# Patient Record
Sex: FEMALE | Race: WHITE
[De-identification: names, ages, dates, MRNs, and addresses within clinical notes are randomized per-mention and may not be internally consistent; named-entity substitution may affect disease eponyms.]

---

## 2020-10-03 ENCOUNTER — HOSPITAL ENCOUNTER (EMERGENCY)
Dept: HOSPITAL 61 - ER | Age: 20
Discharge: HOME | End: 2020-10-03
Payer: COMMERCIAL

## 2020-10-03 VITALS
DIASTOLIC BLOOD PRESSURE: 62 MMHG | SYSTOLIC BLOOD PRESSURE: 126 MMHG | SYSTOLIC BLOOD PRESSURE: 126 MMHG | DIASTOLIC BLOOD PRESSURE: 62 MMHG

## 2020-10-03 VITALS — WEIGHT: 250.45 LBS | BODY MASS INDEX: 41.73 KG/M2 | HEIGHT: 65 IN

## 2020-10-03 DIAGNOSIS — Z90.89: ICD-10-CM

## 2020-10-03 DIAGNOSIS — R60.0: ICD-10-CM

## 2020-10-03 DIAGNOSIS — Z98.890: ICD-10-CM

## 2020-10-03 DIAGNOSIS — F17.200: ICD-10-CM

## 2020-10-03 DIAGNOSIS — M25.532: Primary | ICD-10-CM

## 2020-10-03 DIAGNOSIS — M79.632: ICD-10-CM

## 2020-10-03 PROCEDURE — 29125 APPL SHORT ARM SPLINT STATIC: CPT

## 2020-10-03 PROCEDURE — 99284 EMERGENCY DEPT VISIT MOD MDM: CPT

## 2020-10-03 PROCEDURE — 73090 X-RAY EXAM OF FOREARM: CPT

## 2020-10-03 PROCEDURE — 73110 X-RAY EXAM OF WRIST: CPT

## 2020-10-03 PROCEDURE — A4565 SLINGS: HCPCS

## 2020-10-03 NOTE — PHYS DOC
Past Medical History


Past Medical History:  No Pertinent History


 (COLT JAIME APRN)


Past Surgical History:  Appendectomy


Additional Past Surgical Histo:  TOOTH SX


 (COLT JAIME APRN)


Smoking Status:  Current Every Day Smoker


Alcohol Use:  None


 (COLT JAIME)





General Adult


EDM:


Chief Complaint:  WRIST PAIN





HPI:


HPI:





Patient is a 19  year old female who presents with states this evening she was 

getting up out of her bed when she lost her footing and went to grab to catch 

herself as she was falling and she ended up falling on it please [employer] with 

her left forearm band under her.  She is having left wrist and lateral forearm 

pain.  There is 2+ swelling.  She does have range of motion of her wrist but is 

painful.  She can make a fist but again it is painful and she cannot make a 

tight fist.  She rates her aching pain a 7 out of 10.  She states she did not 

take any pain medicine prior to coming.  Past medical history is smoking in a 

tooth extraction.


 (COLT JAIME APRN)





Review of Systems:


Review of Systems:


Constitutional:   Denies fever or chills. []


Eyes:   Denies change in visual acuity. []


HENT:   Denies nasal congestion or sore throat. [] 


Respiratory:   Denies cough or shortness of breath. [] 


Cardiovascular:   Denies chest pain.  Left wrist and forearm 2+ edema. [] 


GI:   Denies abdominal pain, nausea, vomiting, bloody stools or diarrhea. [] 


:  Denies dysuria. [] 


Musculoskeletal:   Denies back pain.  Left wrist and forearm joint pain. [] 


Integument:   Denies rash. [] 


Neurologic:   Denies headache, focal weakness or sensory changes. [] 


Endocrine:   Denies polyuria or polydipsia. [] 


Lymphatic:  Denies swollen glands. [] 


Psychiatric:  Denies depression or anxiety. []


 (COLT JAIME APRN)





Heart Score:


Risk Factors:


Risk Factors:  DM, Current or recent (<one month) smoker, HTN, HLP, family hi

story of CAD, obesity.


Risk Scores:


Score 0 - 3:  2.5% MACE over next 6 weeks - Discharge Home


Score 4 - 6:  20.3% MACE over next 6 weeks - Admit for Clinical Observation


Score 7 - 10:  72.7% MACE over next 6 weeks - Early Invasive Strategies


 (COLT JAIME)





Physical Exam:


PE:





Constitutional: Well developed, well nourished, no acute distress, non-toxic 

appearance. []


HENT: Normocephalic, atraumatic, bilateral external ears normal, oropharynx 

moist, no oral exudates, nose normal. []


Eyes: PERRLA, EOMI, conjunctiva normal, no discharge. [] 


Neck: Normal range of motion, no tenderness, supple, no stridor. [] 


Cardiovascular:Heart rate regular rhythm, no murmur []


Lungs & Thorax:  Bilateral breath sounds clear to auscultation []


Abdomen: Bowel sounds normal, soft, no tenderness, no masses, no pulsatile 

masses. [] 


Skin: Warm, dry, no erythema, no rash. [] 


Back: No tenderness, no CVA tenderness. [] 


Extremities: Left lateral wrist and forearm tenderness, no cyanosis, no 

clubbing, left wrist ROM limited but intact because of pain, 2+ edema. [] 


Neurologic: Alert and oriented X 3, normal motor function, normal sensory 

function, no focal deficits noted. []


Psychologic: Affect normal, judgement normal, mood normal. []


 (COLT JAIME)





Current Patient Data:


Vital Signs:





                                   Vital Signs








  Date Time  Temp Pulse Resp B/P (MAP) Pulse Ox O2 Delivery O2 Flow Rate FiO2


 


10/3/20 19:10 99.1 83 16 126/60 (82) 98 Room Air  





 99.1       








 (COLT JAIME)





EKG:


EKG:


[]


 (COLT JAIME)





Radiology/Procedures:


Radiology/Procedures:


[]


Impression:


                            General acute hospital


                    [address]  [place], [place] [id number] [phone]


                                        


                                 IMAGING REPORT





                                     Signed





PATIENT: KENNA ANN     ACCOUNT: [id number]     MRN#: [id number]


: 2000           LOCATION: ER              AGE: 19


SEX: F                    EXAM DT: 10/03/20         ACCESSION#: [id number]


STATUS: REG ER            ORD. PHYSICIAN: COLT JAIME


REASON: fall


PROCEDURE: WRIST 3V LEFT





Study:


1. CR WRIST 3V LEFT


2. CR FOREARM LEFT


 


Indication: Fall.


 


Comparison: None.


 


Findings:


 


Forearm:


 


Normal elbow alignment. No elbow joint effusion. The radius and ulna are 


intact.


 


Wrist:


 


No acute fracture. Normal carpal and distal radioulnar joint alignment. 


Maintained joint spaces.


 


Impression:


 


Left forearm/wrist:


 


No acute osseous abnormality.


 


Electronically signed by: LESLIE CONNOR MD (10/3/2020 9:09 PM) [id number]














DICTATED and SIGNED BY:     LESLIE CONNOR MD


DATE:     10/03/20 2109


 (COLT JAIME)





Course & Med Decision Making:


Course & Med Decision Making


Pertinent Labs and Imaging studies reviewed. (See chart for details)





Alert and oriented x4.  Speaks in full clear sentences.  Ambulatory with a 

steady gait.  Tenderness to the left lateral wrist and forearm area.  There is 

2+ swelling.  No bruising or deformity is seen.  No laxity in any joints.  No 

joint deformities.  Radial pulses strong.  Skin pink warm and dry.  Cap refill 

less than 2 seconds.  Patient is given ibuprofen in the emergency room.





X-ray shows no acute findings.  Patient is placed in a volar wrist splint.


[]


 (COLT JAIME)


Course & Med Decision Making


I have reviewed the PA/NP's note and Plan of Care.  I was available for 

consultation as needed during the patient's visit in the emergency department.  

I agree with the clinical impression, plans and disposition.


 (FERNANDA CABAN MD)


Dragon Disclaimer:


Dragon Disclaimer:


This electronic medical record was generated, in whole or in part, using a voice

 recognition dictation system.


 (COLT JAIME)





Departure


Departure


Impression:  


   Primary Impression:  


   Wrist pain, left


Disposition:   HOME, SELF-CARE


Condition:  STABLE


Referrals:  


NO PCP (PCP)








FLORENCIA TEIXEIRA II, MD


Patient Instructions:  Wrist Sprain with [facility]





Additional Instructions:  


Follow-up with a primary care physician or an orthopedic doctor.  Use the wrist 

splint until you are seen by orthopedic.  Take ibuprofen and use ice and 

elevation to help with your pain.


Scripts


Ibuprofen (IBUPROFEN) 600 Mg Tablet


600 MG PO PRN Q6HRS PRN for INFLAMMATION, #20 TAB


   Prov: COLT JAIME         10/3/20











COLT JAIME             Oct 3, 2020 19:27


FERNANDA CABAN MD               Oct 3, 2020 23:20

## 2020-10-03 NOTE — RAD
Study:

1. CR WRIST 3V LEFT

2. CR FOREARM LEFT

 

Indication: Fall.

 

Comparison: None.

 

Findings:

 

Forearm:

 

Normal elbow alignment. No elbow joint effusion. The radius and ulna are 

intact.

 

Wrist:

 

No acute fracture. Normal carpal and distal radioulnar joint alignment. 

Maintained joint spaces.

 

Impression:

 

Left forearm/wrist:

 

No acute osseous abnormality.

 

Electronically signed by: LESLIE CONNOR MD (10/3/2020 9:09 PM) [id number]

## 2022-05-19 ENCOUNTER — HOSPITAL ENCOUNTER (EMERGENCY)
Dept: HOSPITAL 61 - ER | Age: 22
Discharge: HOME | End: 2022-05-19
Payer: COMMERCIAL

## 2022-05-19 DIAGNOSIS — Z20.2: Primary | ICD-10-CM

## 2022-05-19 DIAGNOSIS — F17.200: ICD-10-CM

## 2022-05-19 LAB
BACTERIA #/AREA URNS HPF: (no result) /HPF
RBC #/AREA URNS HPF: (no result) /HPF (ref 0–2)
WBC #/AREA URNS HPF: (no result) /HPF (ref 0–4)

## 2022-05-19 PROCEDURE — 96372 THER/PROPH/DIAG INJ SC/IM: CPT

## 2022-05-19 PROCEDURE — 81025 URINE PREGNANCY TEST: CPT

## 2022-05-19 PROCEDURE — 99283 EMERGENCY DEPT VISIT LOW MDM: CPT

## 2022-05-19 PROCEDURE — 81001 URINALYSIS AUTO W/SCOPE: CPT

## 2022-05-19 PROCEDURE — 87591 N.GONORRHOEAE DNA AMP PROB: CPT

## 2022-05-19 PROCEDURE — 87491 CHLMYD TRACH DNA AMP PROBE: CPT

## 2022-05-19 NOTE — PHYS DOC
Past Medical History


Past Medical History:  No Pertinent History


Past Surgical History:  Appendectomy


Additional Past Surgical Histo:  TOOTH SX


Smoking Status:  Current Every Day Smoker


Alcohol Use:  None





General Adult


EDM:


Chief Complaint:  SEXUALLY TRANSMITTED DISEASE





HPI:


HPI:





Patient is a 21-year-old female presents to the emergency department reporting 

her sexual partner stated he was treated for either gonorrhea chlamydia 3 days 

ago.  Patient denies vaginal discharge afterwards but wants treated.  Patient 

reports her last menstrual cycle 3 weeks ago with normal duration of flow, 

denies abdominal pain or discomfort, denies vaginal bleeding, denies nausea, 

vomiting, diarrhea, denies chest pains.  Patient denies rashes or lesions to her

vagina, denies rashes to other parts of her body.  Patient denies recent fever 

or chills, denies other physical complaints physical concerns.





Review of Systems:


Review of Systems:


14 body systems of review of systems have been reviewed.  See HPI for pertinent 

positives and negative responses, otherwise all other systems are negative, 

nonpertinent or noncontributory.


Constitutional: Negative except as outlined in HPI above.


Skin: Negative except as outlined in HPI above.


Eyes:   Negative except as outlined in HPI above.


HENT: Negative except as outlined in HPI above.


Respiratory:   Negative except as outlined in HPI above.


Cardiovascular:   Negative except as outlined in HPI above.


GI:   Negative except as outlined in HPI above.


:  Negative except as outlined in HPI above.


Musculoskeletal:   Negative except as outlined in HPI above.


Integument:   Negative except as outlined in HPI above.


Neurologic:   Negative except as outlined in HPI above.


Endocrine:   Negative except as outlined in HPI above.


Lymphatic:  Negative except as outlined in HPI above.


Psychiatric:  Negative except as outlined in HPI above.





Heart Score:


C/O Chest Pain:  No


Risk Factors:


Risk Factors:  DM, Current or recent (<one month) smoker, HTN, HLP, family 

history of CAD, obesity.


Risk Scores:


Score 0 - 3:  2.5% MACE over next 6 weeks - Discharge Home


Score 4 - 6:  20.3% MACE over next 6 weeks - Admit for Clinical Observation


Score 7 - 10:  72.7% MACE over next 6 weeks - Early Invasive Strategies





Allergies:


Allergies:





Allergies








Coded Allergies Type Severity Reaction Last Updated Verified


 


  No Known Drug Allergies    10/3/20 No











Physical Exam:


PE:





Constitutional: Well developed, well nourished, no acute distress, non-toxic 

appearance.  21-year-old female in no apparent distress.


HENT: Normocephalic, atraumatic.


Eyes: Conjunctiva normal, no discharge.


Neck: Normal range of motion, no stridor.


Cardiovascular: No cyanosis appreciated, distal cap refill less than 2 seconds.


Lungs & Thorax: Patient is in no respiratory distress, no audible adventitious 

lung sounds appreciated.


Abdomen: Nontender, no abnormalities noted.


Skin: Warm, dry, no erythema, no rash.  


Back: No tenderness, no deformities.


Extremities: No tenderness, no cyanosis, no clubbing, ROM intact, no edema.  


Neurologic: Alert and oriented X 3, normal motor function, normal sensory 

function, no focal deficits noted. 


Psychologic: Affect normal, judgement normal, mood normal.





Current Patient Data:


Labs:





                                Laboratory Tests








Test


 5/19/22


20:25 5/19/22


20:54


 


Urine Collection Type Unknown   


 


Urine Color (Auto) Yellow   


 


Urine Turbidity Clear   


 


Urine pH (Auto)


 6.0 (<5.0-8.0)


 





 


Urine Specific Gravity


 1.022


(1.000-1.030) 





 


Urine Protein (Auto)


 Negative mg/dL


(Negative) 





 


Urine Glucose (Auto)(UA)


 Negative mg/dL


(Negative) 





 


Urine Ketones (Auto)


 Trace mg/dL


(Negative) 





 


Urine Blood (Auto)


 Negative


(Negative) 





 


Urine Nitrite


 Negative


(Negative) 





 


Urine Bilirubin (Auto)


 Negative


(Negative) 





 


Urine Urobilinogen (Auto)


 Normal mg/dL


(Normal) 





 


Urine Leukocyte Esterase


(Auto) Negative


(Negative) 





 


Urine RBC


 Occ /HPF (0-2)


 





 


Urine WBC


 Occ /HPF (0-4)


 





 


Urine Squamous Epithelial


Cells Mod /LPF  


 





 


Urine Bacteria


 Few /HPF


(0-FEW) 





 


Urine Mucus Mod /LPF   


 


POC Urine HCG, Qualitative


 


 Hcg negative


(Negative)











EKG:


EKG:


[]





Radiology/Procedures:


Radiology/Procedures:


[]





Course & Med Decision Making:


Course & Med Decision Making


Pertinent Labs and Imaging studies reviewed. (See chart for details)





21-year-old female, vital signs reviewed, presents to the emergency department 

concerning STI exposure.  Will obtain urinalysis assay, urine pregnancy test, 

will send urine for gonorrhea chlamydia, will treat prophylactically with 500 mg

 Rocephin IM, doxycycline twice daily regimen.





The patient's urine is not infected, she is not pregnant for urine pregnancy 

test, discussed with patient's safe sex practices, condom barrier sex, return to

 ER for worsening symptoms or concerns, follow-up with primary care soon.  

Patient gave verbal understanding and is amenable to ED discharge planning.





Dragon Disclaimer:


Dragon Disclaimer:


This electronic medical record was generated, in whole or in part, using a voice

 recognition dictation system.





Departure


Departure


Impression:  


   Primary Impression:  


   Exposure to sexually transmitted disease (STD)


Disposition:  01 HOME / SELF CARE / HOMELESS


Condition:  GOOD


Referrals:  


NO PCP (PCP)


Patient Instructions:  Sexually Transmitted Disease





Additional Instructions:  


You are seen today in the emergency department after concerns of being exposed 

to a sexually transmitted disease chlamydia.  You were treated for both 

gonorrhea and chlamydia today in the emergency department.  You are given 500 mg

 Rocephin intramuscular injection in the emergency department, I have sent you 

home with a prescription for doxycycline that you will take twice a day for the 

next 7 days.  Please take as directed till complete.  I have attached a list of 

primary care physicians and clinics in this area for you to establish care with,

 please follow-up with your primary care physician soon.  Thank you for visiting

 our Emergency Department.  It was a pleasure taking care of you today in the 

emergency department and we appreciate you trusting us with your care. If any 

additional problems come up don't hesitate to return to visit us. Please follow 

up with your primary care provider so they can plan additional care if needed 

and know about the problem that you had. If symptoms worsen come back to the 

Emergency Department. Any concerning symptoms that start such as chest pain, 

shortness of air, weakness or numbness on one side of the body, running high 

fevers or any other concerning symptoms return to the ER.





[facility]


[address] [place]  [address]


[phone]





[facility]


[address] [place]  [address]


[phone]





[facility]


[address]


[place]  [address]


[phone]





[facility]


[address]


[place]  [address]


[phone]





[facility]


[address] [place]  [address]


[phone]





[facility]


[address] [place]  [address]


[phone]





[facility]


[address]


[place]  [address]


[phone]





[facility]


[address]


[place]  [address]


[phone]





[facility] 


[address]


[place]  [address]


[phone]





[facility]


[address]


[place]  [address]


[phone]


Scripts


Doxycycline Hyclate (DOXYCYCLINE HYCLATE) 100 Mg Capsule


100 MG PO BID for STI for 7 Days, #14 CAP 0 Refills


   Prov: NHUNG BALBUENA         5/19/22











NHUNG BALBUENA       May 19, 2022 23:14